# Patient Record
Sex: MALE | Race: WHITE | Employment: FULL TIME | URBAN - METROPOLITAN AREA
[De-identification: names, ages, dates, MRNs, and addresses within clinical notes are randomized per-mention and may not be internally consistent; named-entity substitution may affect disease eponyms.]

---

## 2017-07-24 ENCOUNTER — OFFICE VISIT (OUTPATIENT)
Dept: INTERNAL MEDICINE CLINIC | Age: 27
End: 2017-07-24

## 2017-07-24 VITALS
TEMPERATURE: 97.9 F | HEIGHT: 71 IN | OXYGEN SATURATION: 99 % | RESPIRATION RATE: 18 BRPM | WEIGHT: 166 LBS | HEART RATE: 62 BPM | BODY MASS INDEX: 23.24 KG/M2 | SYSTOLIC BLOOD PRESSURE: 124 MMHG | DIASTOLIC BLOOD PRESSURE: 74 MMHG

## 2017-07-24 DIAGNOSIS — Z86.19 HX OF CHLAMYDIA INFECTION: ICD-10-CM

## 2017-07-24 DIAGNOSIS — L70.9 ACNE, UNSPECIFIED ACNE TYPE: ICD-10-CM

## 2017-07-24 DIAGNOSIS — L65.9 HAIR LOSS: Primary | ICD-10-CM

## 2017-07-24 DIAGNOSIS — Z79.899 LONG-TERM USE OF HIGH-RISK MEDICATION: ICD-10-CM

## 2017-07-24 RX ORDER — FINASTERIDE 1 MG/1
1 TABLET, FILM COATED ORAL DAILY
Qty: 90 TAB | Refills: 4 | Status: SHIPPED | OUTPATIENT
Start: 2017-07-24 | End: 2017-07-27 | Stop reason: SDUPTHER

## 2017-07-24 NOTE — PROGRESS NOTES
Nadja Cota presents today at the clinic for      Chief Complaint   Patient presents with    Acne    Medication Refill    Ear Fullness        Wt Readings from Last 3 Encounters:   07/24/17 166 lb (75.3 kg)   05/04/16 155 lb (70.3 kg)   04/21/15 166 lb (75.3 kg)     Temp Readings from Last 3 Encounters:   07/24/17 97.9 °F (36.6 °C) (Oral)   05/04/16 98.3 °F (36.8 °C) (Oral)   04/21/15 97.7 °F (36.5 °C) (Oral)     BP Readings from Last 3 Encounters:   07/24/17 124/74   05/04/16 112/70   04/21/15 102/70     Pulse Readings from Last 3 Encounters:   07/24/17 62   05/04/16 87   04/21/15 86       There are no preventive care reminders to display for this patient. Coordination of Care:    1. Have you been to the ER, urgent care clinic since your last visit? Hospitalized since your last visit? No    2. Have you seen or consulted any other health care providers outside of the [address] [place] [address] since your last visit? Include any pap smears or colon screening.  No

## 2017-07-24 NOTE — MR AVS SNAPSHOT
Visit Information Date & Time Provider Department Dept. Phone Encounter #  
 7/24/2017  1:00 PM Daja Loredo PA-C [facility] [phone] Follow-up Instructions Return if symptoms worsen or fail to improve. Upcoming Health Maintenance Date Due INFLUENZA AGE 9 TO ADULT 8/1/2017 DTaP/Tdap/Td series (2 - Td) 7/24/2027 Allergies as of 7/24/2017  Review Complete On: 7/24/2017 By: Ivan Hope LPN Severity Noted Reaction Type Reactions Morphine  06/12/2012    Hives Current Immunizations  Never Reviewed No immunizations on file. Not reviewed this visit You Were Diagnosed With   
  
 Codes Comments Hair loss    -  Primary ICD-10-CM: L65.9 ICD-9-CM: 704.00 Long-term use of high-risk medication     ICD-10-CM: Z79.899 ICD-9-CM: V58.69 Hx of chlamydia infection     ICD-10-CM: Z86.19 ICD-9-CM: V12.09 Acne, unspecified acne type     ICD-10-CM: L70.9 ICD-9-CM: 706.1 Vitals BP Pulse Temp Resp Height(growth percentile) Weight(growth percentile) 124/74 (BP 1 Location: Left arm, BP Patient Position: Sitting) 62 97.9 °F (36.6 °C) (Oral) 18 5' 11\" (1.803 m) 166 lb (75.3 kg) SpO2 BMI Smoking Status 99% 23.15 kg/m2 Never Smoker BMI and BSA Data Body Mass Index Body Surface Area  
 23.15 kg/m 2 1.94 m 2 Preferred Pharmacy Pharmacy Name Phone [facility] [address] [facility], [address] Your Updated Medication List  
  
   
This list is accurate as of: 7/24/17 11:59 PM.  Always use your most recent med list.  
  
  
  
  
 finasteride 1 mg tablet Commonly known as:  Stas Medal Take 1 Tab by mouth daily. Prescriptions Sent to Pharmacy Refills  
 finasteride (PROPECIA) 1 mg tablet (Discontinued) 4 Sig: Take 1 Tab by mouth daily.   
 Class: Normal  
 Pharmacy: [facility] [address], [address] #: [phone] Route: Oral  
 Reason for Discontinue: Reorder We Performed the Following CHLAMYDIA/GC AMPLIFIED URINE [id number] CPT(R)] CHLAMYDIA/GC PCR [13237 CPT(R)] METABOLIC PANEL, COMPREHENSIVE [31439 CPT(R)] RI COLLECTION VENOUS BLOOD,VENIPUNCTURE [id number] CPT(R)] Follow-up Instructions Return if symptoms worsen or fail to improve. To-Do List   
 07/24/2017 Lab:  CHLAMYDIA/GC PCR   
  
 07/24/2017 Lab:  METABOLIC PANEL, COMPREHENSIVE Patient Instructions Hair Loss From Alopecia Areata: Care Instructions Your Care Instructions Alopecia areata is a type of hair loss that affects the hair on the scalp or other areas of the body. It's a problem that can go away for some time and then come back. This condition is most common in people who are younger than 21, but it can happen to children and adults of any age. Hair loss can affect how you feel about yourself. Your hair may fall out in clumps and grow back over time. In rare cases, a person with alopecia may lose all body hair. The pattern of hair loss and growth is different for everyone. You can treat alopecia with medicine, but treatment does not always work. You may have shots of medicine in your scalp or skin, take pills, or put the medicine on your scalp or skin. Or you may decide to wait and see whether your hair grows again before trying medicine. Because hair loss is upsetting for most people, seek support from family and friends. Talk to a counselor or other professional if you need more help. Follow-up care is a key part of your treatment and safety. Be sure to make and go to all appointments, and call your doctor if you are having problems. It's also a good idea to know your test results and keep a list of the medicines you take. How can you care for yourself at home? · If you decide to treat your hair loss, use medicines exactly as prescribed. Call your doctor if you think you are having a problem with your medicine. · If you want to cover your scalp, you can use hats, scarves, or other head coverings. Or you may want to wear a hairpiece or a wig. · Try hair care products and styling techniques. Hair care products or perms may make hair appear thicker. You can use dyes to color the scalp. But long-term use of perms or dyes may lead to more hair loss. · Talk to your doctor if you are very upset about your hair loss. You can get counseling to help you cope with the condition. When should you call for help? Watch closely for changes in your health, and be sure to contact your doctor if: 
· You do not get better as expected. Where can you learn more? Go to http://jaqueline-jante.info/. Enter S895 in the search box to learn more about \"Hair Loss From Alopecia Areata: Care Instructions. \" Current as of: October 13, 2016 Content Version: 11.3 © 6927-8076 Blyk. Care instructions adapted under license by Snowshoefood (which disclaims liability or warranty for this information). If you have questions about a medical condition or this instruction, always ask your healthcare professional. [address] any warranty or liability for your use of this information. Introducing [facility]! [facility] introduces E-Buy patient portal. Now you can access parts of your medical record, email your doctor's office, and request medication refills online. 1. In your internet browser, go to [email or web address] 2. Click on the First Time User? Click Here link in the Sign In box. You will see the New Member Sign Up page. 3. Enter your E-Buy Access Code exactly as it appears below. You will not need to use this code after youve completed the sign-up process.  If you do not sign up before the expiration date, you must request a new code. · CritiTech Access Code: [email or web address] Expires: 10/26/2017 11:15 AM 
 
4. Enter the last four digits of your Social Security Number (xxxx) and Date of Birth (mm/dd/yyyy) as indicated and click Submit. You will be taken to the next sign-up page. 5. Create a CritiTech ID. This will be your CritiTech login ID and cannot be changed, so think of one that is secure and easy to remember. 6. Create a CritiTech password. You can change your password at any time. 7. Enter your Password Reset Question and Answer. This can be used at a later time if you forget your password. 8. Enter your e-mail address. You will receive e-mail notification when new information is available in [address]. 9. Click Sign Up. You can now view and download portions of your medical record. 10. Click the Download Summary menu link to download a portable copy of your medical information. If you have questions, please visit the Frequently Asked Questions section of the CritiTech website. Remember, CritiTech is NOT to be used for urgent needs. For medical emergencies, dial 911. Now available from your iPhone and Android! Please provide this summary of care documentation to your next provider. Your primary care clinician is listed as Mishel Garcia. If you have any questions after today's visit, please call [phone].

## 2017-07-25 ENCOUNTER — TELEPHONE (OUTPATIENT)
Dept: INTERNAL MEDICINE CLINIC | Age: 27
End: 2017-07-25

## 2017-07-25 LAB
A-G RATIO,AGRAT: 1.5 RATIO (ref 1.1–2.6)
ALBUMIN SERPL-MCNC: 4.6 G/DL (ref 3.5–5)
ALP SERPL-CCNC: 59 U/L (ref 25–115)
ALT SERPL-CCNC: 12 U/L (ref 5–40)
ANION GAP SERPL CALC-SCNC: 15 MMOL/L
AST SERPL W P-5'-P-CCNC: 23 U/L (ref 10–37)
BILIRUB SERPL-MCNC: 0.9 MG/DL (ref 0.2–1.2)
BUN SERPL-MCNC: 14 MG/DL (ref 6–22)
CALCIUM SERPL-MCNC: 8.9 MG/DL (ref 8.4–10.4)
CHLAMYDIA AMPLIFIED URINE: NEGATIVE
CHLORIDE SERPL-SCNC: 101 MMOL/L (ref 98–110)
CO2 SERPL-SCNC: 24 MMOL/L (ref 20–32)
CREAT SERPL-MCNC: 0.9 MG/DL (ref 0.5–1.2)
GC AMPLIFIED URINE,919: NEGATIVE
GFRAA, 66117: >60
GFRNA, 66118: >60
GLOBULIN,GLOB: 3 G/DL (ref 2–4)
GLUCOSE SERPL-MCNC: 66 MG/DL (ref 65–99)
POTASSIUM SERPL-SCNC: 4.4 MMOL/L (ref 3.5–5.5)
PROT SERPL-MCNC: 7.6 G/DL (ref 6.4–8.3)
SODIUM SERPL-SCNC: 140 MMOL/L (ref 133–145)

## 2017-07-27 DIAGNOSIS — L65.9 HAIR LOSS: ICD-10-CM

## 2017-07-27 RX ORDER — FINASTERIDE 1 MG/1
TABLET, FILM COATED ORAL
Qty: 90 TAB | Refills: 3 | Status: SHIPPED | OUTPATIENT
Start: 2017-07-27 | End: 2018-09-24 | Stop reason: SDUPTHER

## 2017-07-28 NOTE — PROGRESS NOTES
HISTORY OF PRESENT ILLNESS  Britt Raymond is a 32 y.o. male. HPI   Pt is here for a refill on his propecia. He has recently started breaking out on his chest and back and was wondering what he should do. He has been working out more and sweating a lot. He also would like STD testing because he is starting a new relationship and wants to be safe. Denies any discharge, dysuria, and urinary frequency. He has had chlamydia about 4-5 yrs ago and was treated but never followed up for repeat testing. Allergies   Allergen Reactions    Morphine Hives       Current Outpatient Prescriptions   Medication Sig    finasteride (PROPECIA) 1 mg tablet TAKE ONE TABLET BY MOUTH ONCE DAILY     No current facility-administered medications for this visit. Review of Systems   Constitutional: Negative. Negative for chills, fever and malaise/fatigue. HENT: Negative. Negative for congestion, ear pain, sore throat and tinnitus. Eyes: Negative. Negative for blurred vision, double vision and photophobia. Respiratory: Negative. Negative for cough, shortness of breath and wheezing. Cardiovascular: Negative. Negative for chest pain, palpitations and leg swelling. Gastrointestinal: Negative. Negative for abdominal pain, heartburn, nausea and vomiting. Genitourinary: Negative. Negative for dysuria, frequency, hematuria and urgency. Musculoskeletal: Negative. Negative for back pain, joint pain, myalgias and neck pain. Skin: Negative for itching and rash. acne   Neurological: Negative. Negative for dizziness, tingling, tremors and headaches. Psychiatric/Behavioral: Negative. Negative for depression and memory loss. The patient is not nervous/anxious and does not have insomnia.       Visit Vitals    /74 (BP 1 Location: Left arm, BP Patient Position: Sitting)    Pulse 62    Temp 97.9 °F (36.6 °C) (Oral)    Resp 18    Ht 5' 11\" (1.803 m)    Wt 166 lb (75.3 kg)    SpO2 99%    BMI 23.15 kg/m2       Physical Exam   Constitutional: He is oriented to person, place, and time. He appears well-developed and well-nourished. HENT:   Head: Normocephalic and atraumatic. Nose: Nose normal.   Mouth/Throat: Oropharynx is clear and moist.   Cardiovascular: Normal rate, regular rhythm and normal heart sounds. Exam reveals no gallop and no friction rub. No murmur heard. Pulmonary/Chest: Effort normal and breath sounds normal. No respiratory distress. He has no wheezes. He has no rales. Neurological: He is alert and oriented to person, place, and time. Skin: Skin is warm and dry. Rash noted. Rash is pustular (upper back and chest). Psychiatric: He has a normal mood and affect. His behavior is normal.       ASSESSMENT and PLAN    ICD-10-CM ICD-9-CM    1. Hair loss L65.9 704.00 DISCONTINUED: finasteride (PROPECIA) 1 mg tablet   2. Long-term use of high-risk medication A13.154 G19.93 METABOLIC PANEL, COMPREHENSIVE      METABOLIC PANEL, COMPREHENSIVE      SD COLLECTION VENOUS BLOOD,VENIPUNCTURE   3. Hx of chlamydia infection Z86.19 V12.09 CHLAMYDIA/GC PCR      CHLAMYDIA/GC PCR   4. Acne, unspecified acne type L70.9 706.1      Follow-up Disposition:  Return if symptoms worsen or fail to improve. Pt expressed understanding of visit summary and plans for any follow ups or referrals as well as any medications prescribed.

## 2017-07-28 NOTE — PATIENT INSTRUCTIONS
Hair Loss From Alopecia Areata: Care Instructions  Your Care Instructions    Alopecia areata is a type of hair loss that affects the hair on the scalp or other areas of the body. It's a problem that can go away for some time and then come back. This condition is most common in people who are younger than 21, but it can happen to children and adults of any age. Hair loss can affect how you feel about yourself. Your hair may fall out in clumps and grow back over time. In rare cases, a person with alopecia may lose all body hair. The pattern of hair loss and growth is different for everyone. You can treat alopecia with medicine, but treatment does not always work. You may have shots of medicine in your scalp or skin, take pills, or put the medicine on your scalp or skin. Or you may decide to wait and see whether your hair grows again before trying medicine. Because hair loss is upsetting for most people, seek support from family and friends. Talk to a counselor or other professional if you need more help. Follow-up care is a key part of your treatment and safety. Be sure to make and go to all appointments, and call your doctor if you are having problems. It's also a good idea to know your test results and keep a list of the medicines you take. How can you care for yourself at home? · If you decide to treat your hair loss, use medicines exactly as prescribed. Call your doctor if you think you are having a problem with your medicine. · If you want to cover your scalp, you can use hats, scarves, or other head coverings. Or you may want to wear a hairpiece or a wig. · Try hair care products and styling techniques. Hair care products or perms may make hair appear thicker. You can use dyes to color the scalp. But long-term use of perms or dyes may lead to more hair loss. · Talk to your doctor if you are very upset about your hair loss. You can get counseling to help you cope with the condition.   When should you call for help? Watch closely for changes in your health, and be sure to contact your doctor if:  · You do not get better as expected. Where can you learn more? Go to http://jaqueline-janet.info/. Enter R919 in the search box to learn more about \"Hair Loss From Alopecia Areata: Care Instructions. \"  Current as of: October 13, 2016  Content Version: 11.3  © 2070-1726 Solar Power Limited. Care instructions adapted under license by Try [employer] World (which disclaims liability or warranty for this information). If you have questions about a medical condition or this instruction, always ask your healthcare professional. [address] any warranty or liability for your use of this information.

## 2017-10-19 ENCOUNTER — OFFICE VISIT (OUTPATIENT)
Dept: INTERNAL MEDICINE CLINIC | Age: 27
End: 2017-10-19

## 2017-10-19 VITALS
TEMPERATURE: 97.9 F | SYSTOLIC BLOOD PRESSURE: 116 MMHG | HEIGHT: 71 IN | OXYGEN SATURATION: 98 % | DIASTOLIC BLOOD PRESSURE: 76 MMHG | RESPIRATION RATE: 18 BRPM | HEART RATE: 90 BPM | WEIGHT: 164 LBS | BODY MASS INDEX: 22.96 KG/M2

## 2017-10-19 DIAGNOSIS — G44.219 EPISODIC TENSION-TYPE HEADACHE, NOT INTRACTABLE: Primary | ICD-10-CM

## 2017-10-19 RX ORDER — CYCLOBENZAPRINE HCL 10 MG
10 TABLET ORAL
Qty: 30 TAB | Refills: 0 | Status: SHIPPED | OUTPATIENT
Start: 2017-10-19 | End: 2018-09-24

## 2017-10-19 RX ORDER — IBUPROFEN 800 MG/1
800 TABLET ORAL
Qty: 60 TAB | Refills: 1 | Status: SHIPPED | OUTPATIENT
Start: 2017-10-19 | End: 2018-09-24

## 2017-10-19 NOTE — PROGRESS NOTES
Alexandra Colorado presents today at the clinic for      Chief Complaint   Patient presents with    Headache     x2 weeks        Wt Readings from Last 3 Encounters:   10/19/17 164 lb (74.4 kg)   07/24/17 166 lb (75.3 kg)   05/04/16 155 lb (70.3 kg)     Temp Readings from Last 3 Encounters:   10/19/17 97.9 °F (36.6 °C) (Oral)   07/24/17 97.9 °F (36.6 °C) (Oral)   05/04/16 98.3 °F (36.8 °C) (Oral)     BP Readings from Last 3 Encounters:   10/19/17 116/76   07/24/17 124/74   05/04/16 112/70     Pulse Readings from Last 3 Encounters:   10/19/17 90   07/24/17 62   05/04/16 87       There are no preventive care reminders to display for this patient. Coordination of Care:    1. Have you been to the ER, urgent care clinic since your last visit? Hospitalized since your last visit? No    2. Have you seen or consulted any other health care providers outside of the [address] [place] [address] since your last visit? Include any pap smears or colon screening.  No

## 2017-10-19 NOTE — MR AVS SNAPSHOT
Visit Information Date & Time Provider Department Dept. Phone Encounter #  
 10/19/2017  2:45 PM Ezequiel Walker PA-C [facility] [phone] [id number] Follow-up Instructions Return if symptoms worsen or fail to improve. Upcoming Health Maintenance Date Due DTaP/Tdap/Td series (2 - Td) 7/24/2027 Allergies as of 10/19/2017  Review Complete On: 10/19/2017 By: Teresa Chi LPN Severity Noted Reaction Type Reactions Morphine  06/12/2012    Hives Current Immunizations  Never Reviewed No immunizations on file. Not reviewed this visit You Were Diagnosed With   
  
 Codes Comments Episodic tension-type headache, not intractable    -  Primary ICD-10-CM: T40.651 ICD-9-CM: 339.11 Vitals BP Pulse Temp Resp Height(growth percentile) Weight(growth percentile) 116/76 (BP 1 Location: Left arm, BP Patient Position: Sitting) 90 97.9 °F (36.6 °C) (Oral) 18 5' 11\" (1.803 m) 164 lb (74.4 kg) SpO2 BMI Smoking Status 98% 22.87 kg/m2 Never Smoker BMI and BSA Data Body Mass Index Body Surface Area  
 22.87 kg/m 2 1.93 m 2 Preferred Pharmacy Pharmacy Name Phone [facility] [address] [facility], [address] [place] [address] Your Updated Medication List  
  
   
This list is accurate as of: 10/19/17 11:59 PM.  Always use your most recent med list.  
  
  
  
  
 cyclobenzaprine 10 mg tablet Commonly known as:  FLEXERIL Take 1 Tab by mouth three (3) times daily as needed for Muscle Spasm(s). finasteride 1 mg tablet Commonly known as:  Mazin Sacks TAKE ONE TABLET BY MOUTH ONCE DAILY  
  
 ibuprofen 800 mg tablet Commonly known as:  MOTRIN Take 1 Tab by mouth every eight (8) hours as needed for Pain. Prescriptions Sent to Pharmacy  Refills  
 cyclobenzaprine (FLEXERIL) 10 mg tablet 0  
 Sig: Take 1 Tab by mouth three (3) times daily as needed for Muscle Spasm(s). Class: Normal  
 Pharmacy: [address], [address] [place] [address] Ph #: [phone] Route: Oral  
 ibuprofen (MOTRIN) 800 mg tablet 1 Sig: Take 1 Tab by mouth every eight (8) hours as needed for Pain. Class: Normal  
 Pharmacy: [address], [address] [place] [address] Ph #: [phone] Route: Oral  
  
Follow-up Instructions Return if symptoms worsen or fail to improve. Patient Instructions Headache: Care Instructions Your Care Instructions Headaches have many possible causes. Most headaches aren't a sign of a more serious problem, and they will get better on their own. Home treatment may help you feel better faster. The doctor has checked you carefully, but problems can develop later. If you notice any problems or new symptoms, get medical treatment right away. Follow-up care is a key part of your treatment and safety. Be sure to make and go to all appointments, and call your doctor if you are having problems. It's also a good idea to know your test results and keep a list of the medicines you take. How can you care for yourself at home? · Do not drive if you have taken a prescription pain medicine. · Rest in a quiet, dark room until your headache is gone. Close your eyes and try to relax or go to sleep. Don't watch TV or read. · Put a cold, moist cloth or cold pack on the painful area for 10 to 20 minutes at a time. Put a thin cloth between the cold pack and your skin. · Use a warm, moist towel or a heating pad set on low to relax tight shoulder and neck muscles. · Have someone gently massage your neck and shoulders. · Take pain medicines exactly as directed. ¨ If the doctor gave you a prescription medicine for pain, take it as prescribed.  
¨ If you are not taking a prescription pain medicine, ask your doctor if you can take an over-the-counter medicine. · Be careful not to take pain medicine more often than the instructions allow, because you may get worse or more frequent headaches when the medicine wears off. · Do not ignore new symptoms that occur with a headache, such as a fever, weakness or numbness, vision changes, or confusion. These may be signs of a more serious problem. To prevent headaches · Keep a headache diary so you can figure out what triggers your headaches. Avoiding triggers may help you prevent headaches. Record when each headache began, how long it lasted, and what the pain was like (throbbing, aching, stabbing, or dull). Write down any other symptoms you had with the headache, such as nausea, flashing lights or dark spots, or sensitivity to bright light or loud noise. Note if the headache occurred near your period. List anything that might have triggered the headache, such as certain foods (chocolate, cheese, wine) or odors, smoke, bright light, stress, or lack of sleep. · Find healthy ways to deal with stress. Headaches are most common during or right after stressful times. Take time to relax before and after you do something that has caused a headache in the past. 
· Try to keep your muscles relaxed by keeping good posture. Check your jaw, face, neck, and shoulder muscles for tension, and try relaxing them. When sitting at a desk, change positions often, and stretch for 30 seconds each hour. · Get plenty of sleep and exercise. · Eat regularly and well. Long periods without food can trigger a headache. · Treat yourself to a massage. Some people find that regular massages are very helpful in relieving tension. · Limit caffeine by not drinking too much coffee, tea, or soda. But don't quit caffeine suddenly, because that can also give you headaches.  
· Reduce eyestrain from computers by blinking frequently and looking away from the computer screen every so often. Make sure you have proper eyewear and that your monitor is set up properly, about an arm's length away. · Seek help if you have depression or anxiety. Your headaches may be linked to these conditions. Treatment can both prevent headaches and help with symptoms of anxiety or depression. When should you call for help? Call 911 anytime you think you may need emergency care. For example, call if: 
· You have signs of a stroke. These may include: 
¨ Sudden numbness, paralysis, or weakness in your face, arm, or leg, especially on only one side of your body. ¨ Sudden vision changes. ¨ Sudden trouble speaking. ¨ Sudden confusion or trouble understanding simple statements. ¨ Sudden problems with walking or balance. ¨ A sudden, severe headache that is different from past headaches. Call your doctor now or seek immediate medical care if: 
· You have a new or worse headache. · Your headache gets much worse. Where can you learn more? Go to http://jaqueline-janet.info/. Enter M271 in the search box to learn more about \"Headache: Care Instructions. \" Current as of: October 14, 2016 Content Version: 11.3 © 4333-6721 Able Planet. Care instructions adapted under license by Koko (which disclaims liability or warranty for this information). If you have questions about a medical condition or this instruction, always ask your healthcare professional. [address] any warranty or liability for your use of this information. Introducing [facility]! Romayne Duster introduces woohoo mobile marketing patient portal. Now you can access parts of your medical record, email your doctor's office, and request medication refills online. 1. In your internet browser, go to [email or web address] 2. Click on the First Time User? Click Here link in the Sign In box. You will see the New Member Sign Up page. 3. Enter your AdzCentral Access Code exactly as it appears below. You will not need to use this code after youve completed the sign-up process. If you do not sign up before the expiration date, you must request a new code. · AdzCentral Access Code: [email or web address] Expires: 10/26/2017 11:15 AM 
 
4. Enter the last four digits of your Social Security Number (xxxx) and Date of Birth (mm/dd/yyyy) as indicated and click Submit. You will be taken to the next sign-up page. 5. Create a AdzCentral ID. This will be your AdzCentral login ID and cannot be changed, so think of one that is secure and easy to remember. 6. Create a AdzCentral password. You can change your password at any time. 7. Enter your Password Reset Question and Answer. This can be used at a later time if you forget your password. 8. Enter your e-mail address. You will receive e-mail notification when new information is available in [address] 9. Click Sign Up. You can now view and download portions of your medical record. 10. Click the Download Summary menu link to download a portable copy of your medical information. If you have questions, please visit the Frequently Asked Questions section of the AdzCentral website. Remember, AdzCentral is NOT to be used for urgent needs. For medical emergencies, dial 911. Now available from your iPhone and Android! Please provide this summary of care documentation to your next provider. Your primary care clinician is listed as Pop Garcia. If you have any questions after today's visit, please call [phone].

## 2017-10-23 NOTE — PATIENT INSTRUCTIONS

## 2017-10-23 NOTE — PROGRESS NOTES
HISTORY OF PRESENT ILLNESS  Taniya George is a 32 y.o. male. HPI   Pt c/o HA x 10d that started when he was lifting weights. Pt states he felt a pull in his neck and since has had a HA that starts from his neck and radiates up to his head. Pt has had the GROVES everyday and is better when he wakes up but worse when as the day goes on. Pt has a hx of migraines and states they don't feel like this. Heat helps temporarily. Denies N/V, vision changes, fever, chills, congestion, and joint pain. Allergies   Allergen Reactions    Morphine Hives       Current Outpatient Prescriptions   Medication Sig    cyclobenzaprine (FLEXERIL) 10 mg tablet Take 1 Tab by mouth three (3) times daily as needed for Muscle Spasm(s).  ibuprofen (MOTRIN) 800 mg tablet Take 1 Tab by mouth every eight (8) hours as needed for Pain.  finasteride (PROPECIA) 1 mg tablet TAKE ONE TABLET BY MOUTH ONCE DAILY     No current facility-administered medications for this visit. Review of Systems   Constitutional: Negative. Negative for chills, fever and malaise/fatigue. HENT: Negative. Negative for congestion, ear pain, sore throat and tinnitus. Eyes: Negative. Negative for blurred vision, double vision and photophobia. Respiratory: Negative. Negative for cough, shortness of breath and wheezing. Cardiovascular: Negative. Negative for chest pain, palpitations and leg swelling. Gastrointestinal: Negative. Negative for abdominal pain, heartburn, nausea and vomiting. Genitourinary: Negative. Negative for dysuria, frequency, hematuria and urgency. Musculoskeletal: Negative. Negative for back pain, joint pain, myalgias and neck pain. Skin: Negative. Negative for itching and rash. Neurological: Positive for headaches. Negative for dizziness, tingling and tremors. Psychiatric/Behavioral: Negative. Negative for depression and memory loss. The patient is not nervous/anxious and does not have insomnia.       Visit Vitals  /76 (BP 1 Location: Left arm, BP Patient Position: Sitting)    Pulse 90    Temp 97.9 °F (36.6 °C) (Oral)    Resp 18    Ht 5' 11\" (1.803 m)    Wt 164 lb (74.4 kg)    SpO2 98%    BMI 22.87 kg/m2       Physical Exam   Constitutional: He is oriented to person, place, and time. He appears well-developed and well-nourished. HENT:   Head: Normocephalic and atraumatic. Nose: Nose normal.   Mouth/Throat: Oropharynx is clear and moist.   Eyes: Conjunctivae and EOM are normal. Right eye exhibits no nystagmus. Left eye exhibits no nystagmus. Cardiovascular: Normal rate, regular rhythm and normal heart sounds. Exam reveals no gallop and no friction rub. No murmur heard. Pulmonary/Chest: Effort normal and breath sounds normal. No respiratory distress. He has no wheezes. He has no rales. Musculoskeletal:        Cervical back: He exhibits tenderness and spasm. He exhibits normal range of motion, no bony tenderness, no swelling and no deformity. Neurological: He is alert and oriented to person, place, and time. Skin: Skin is warm and dry. Psychiatric: He has a normal mood and affect. His behavior is normal.       ASSESSMENT and PLAN    ICD-10-CM ICD-9-CM    1. Episodic tension-type headache, not intractable G44.219 339.11 cyclobenzaprine (FLEXERIL) 10 mg tablet      ibuprofen (MOTRIN) 800 mg tablet     Follow-up Disposition:  Return if symptoms worsen or fail to improve. Pt expressed understanding of visit summary and plans for any follow ups or referrals as well as any medications prescribed.

## 2017-10-25 ENCOUNTER — OFFICE VISIT (OUTPATIENT)
Dept: INTERNAL MEDICINE CLINIC | Age: 27
End: 2017-10-25

## 2017-10-25 ENCOUNTER — TELEPHONE (OUTPATIENT)
Dept: INTERNAL MEDICINE CLINIC | Age: 27
End: 2017-10-25

## 2017-10-25 DIAGNOSIS — R51.9 NONINTRACTABLE HEADACHE, UNSPECIFIED CHRONICITY PATTERN, UNSPECIFIED HEADACHE TYPE: Primary | ICD-10-CM

## 2017-10-26 DIAGNOSIS — R51.9 NONINTRACTABLE HEADACHE, UNSPECIFIED CHRONICITY PATTERN, UNSPECIFIED HEADACHE TYPE: ICD-10-CM

## 2017-11-02 ENCOUNTER — HOSPITAL ENCOUNTER (OUTPATIENT)
Dept: MRI IMAGING | Age: 27
Discharge: HOME | End: 2017-11-02
Attending: PHYSICIAN ASSISTANT
Payer: COMMERCIAL

## 2017-11-02 VITALS — WEIGHT: 170 LBS | BODY MASS INDEX: 23.71 KG/M2

## 2017-11-02 DIAGNOSIS — R51.9 NONINTRACTABLE HEADACHE, UNSPECIFIED CHRONICITY PATTERN, UNSPECIFIED HEADACHE TYPE: ICD-10-CM

## 2017-11-02 PROCEDURE — A9577 INJ MULTIHANCE: HCPCS | Performed by: PHYSICIAN ASSISTANT

## 2017-11-02 PROCEDURE — 74011250636 HC RX REV CODE- 250/636: Performed by: PHYSICIAN ASSISTANT

## 2017-11-02 PROCEDURE — 70553 MRI BRAIN STEM W/O & W/DYE: CPT

## 2017-11-02 RX ADMIN — GADOBENATE DIMEGLUMINE 15 ML: 529 INJECTION, SOLUTION INTRAVENOUS at 08:42

## 2018-09-24 ENCOUNTER — OFFICE VISIT (OUTPATIENT)
Dept: INTERNAL MEDICINE CLINIC | Age: 28
End: 2018-09-24

## 2018-09-24 VITALS
DIASTOLIC BLOOD PRESSURE: 82 MMHG | OXYGEN SATURATION: 98 % | HEART RATE: 79 BPM | TEMPERATURE: 98.3 F | BODY MASS INDEX: 24.64 KG/M2 | SYSTOLIC BLOOD PRESSURE: 125 MMHG | HEIGHT: 71 IN | WEIGHT: 176 LBS | RESPIRATION RATE: 18 BRPM

## 2018-09-24 DIAGNOSIS — L65.9 HAIR LOSS: ICD-10-CM

## 2018-09-24 DIAGNOSIS — L65.9 HAIR LOSS: Primary | ICD-10-CM

## 2018-09-24 RX ORDER — FINASTERIDE 1 MG/1
TABLET, FILM COATED ORAL
Qty: 90 TAB | Refills: 3 | Status: CANCELLED | OUTPATIENT
Start: 2018-09-24

## 2018-09-24 RX ORDER — FINASTERIDE 1 MG/1
TABLET, FILM COATED ORAL
Qty: 90 TAB | Refills: 3 | Status: SHIPPED | OUTPATIENT
Start: 2018-09-24 | End: 2019-11-05 | Stop reason: SDUPTHER

## 2018-09-24 NOTE — PATIENT INSTRUCTIONS
Hair Loss From Alopecia Areata: Care Instructions Your Care Instructions Alopecia areata is a type of hair loss that affects the hair on the scalp or other areas of the body. It's a problem that can go away for some time and then come back. This condition is most common in people who are younger than 21, but it can happen to children and adults of any age. Hair loss can affect how you feel about yourself. Your hair may fall out in clumps and grow back over time. In rare cases, a person with alopecia may lose all body hair. The pattern of hair loss and growth is different for everyone. You can treat alopecia with medicine, but treatment does not always work. You may have shots of medicine in your scalp or skin, take pills, or put the medicine on your scalp or skin. Or you may decide to wait and see whether your hair grows again before trying medicine. Because hair loss is upsetting for most people, seek support from family and friends. Talk to a counselor or other professional if you need more help. Follow-up care is a key part of your treatment and safety. Be sure to make and go to all appointments, and call your doctor if you are having problems. It's also a good idea to know your test results and keep a list of the medicines you take. How can you care for yourself at home? · If you decide to treat your hair loss, use medicines exactly as prescribed. Call your doctor if you think you are having a problem with your medicine. · If you want to cover your scalp, you can use hats, scarves, or other head coverings. Or you may want to wear a hairpiece or a wig. · Try hair care products and styling techniques. Hair care products or perms may make hair appear thicker. You can use dyes to color the scalp. But long-term use of perms or dyes may lead to more hair loss. · Talk to your doctor if you are very upset about your hair loss. You can get counseling to help you cope with the condition. When should you call for help? Watch closely for changes in your health, and be sure to contact your doctor if: 
  · You do not get better as expected. Where can you learn more? Go to http://jaqueline-janet.info/. Enter F145 in the search box to learn more about \"Hair Loss From Alopecia Areata: Care Instructions. \" Current as of: October 5, 2017 Content Version: 11.7 © 1844-0433 [employer]. Care instructions adapted under license by [employer] (which disclaims liability or warranty for this information). If you have questions about a medical condition or this instruction, always ask your healthcare professional. [address] any warranty or liability for your use of this information.

## 2018-09-24 NOTE — MR AVS SNAPSHOT
Watson Sales 
 
 
 [facility] [address] [facility] [address] 
[phone] Patient: Monta Boast MRN: [id number] [id number] Visit Information Date & Time Provider Department Dept. Phone Encounter #  
 9/24/2018  2:00 PM Rajani Gonzalez PA-C [facility] [phone] [id number] Follow-up Instructions Return in about 1 year (around 9/24/2019) for hair loss. Upcoming Health Maintenance Date Due Influenza Age 5 to Adult 8/1/2018 DTaP/Tdap/Td series (2 - Td) 7/24/2027 Allergies as of 9/24/2018  Review Complete On: 9/24/2018 By: Farhad Blackburn LPN Severity Noted Reaction Type Reactions Morphine  06/12/2012    Hives Current Immunizations  Never Reviewed No immunizations on file. Not reviewed this visit You Were Diagnosed With   
  
 Codes Comments Hair loss    -  Primary ICD-10-CM: L65.9 ICD-9-CM: 704.00 Vitals BP Pulse Temp Resp Height(growth percentile) Weight(growth percentile) 125/82 79 98.3 °F (36.8 °C) (Oral) 18 5' 11\" (1.803 m) 176 lb (79.8 kg) SpO2 BMI Smoking Status 98% 24.55 kg/m2 Never Smoker BMI and BSA Data Body Mass Index Body Surface Area 24.55 kg/m 2 2 m 2 Preferred Pharmacy Pharmacy Name Phone [facility] [address] [facility] 97. Your Updated Medication List  
  
   
This list is accurate as of 9/24/18  3:01 PM.  Always use your most recent med list.  
  
  
  
  
 cyclobenzaprine 10 mg tablet Commonly known as:  FLEXERIL Take 1 Tab by mouth three (3) times daily as needed for Muscle Spasm(s). finasteride 1 mg tablet Commonly known as:  Andrew Fend TAKE ONE TABLET BY MOUTH ONCE DAILY  
  
 ibuprofen 800 mg tablet Commonly known as:  MOTRIN Take 1 Tab by mouth every eight (8) hours as needed for Pain. Prescriptions Sent to Pharmacy Refills  
 finasteride (PROPECIA) 1 mg tablet 3 Sig: TAKE ONE TABLET BY MOUTH ONCE DAILY Class: Normal  
 Pharmacy: [facility] [address] [place] [address], [address] [place] [address] #: [phone] Follow-up Instructions Return in about 1 year (around 9/24/2019) for hair loss. To-Do List   
 09/24/2018 Lab:  METABOLIC PANEL, COMPREHENSIVE Patient Instructions Hair Loss From Alopecia Areata: Care Instructions Your Care Instructions Alopecia areata is a type of hair loss that affects the hair on the scalp or other areas of the body. It's a problem that can go away for some time and then come back. This condition is most common in people who are younger than 21, but it can happen to children and adults of any age. Hair loss can affect how you feel about yourself. Your hair may fall out in clumps and grow back over time. In rare cases, a person with alopecia may lose all body hair. The pattern of hair loss and growth is different for everyone. You can treat alopecia with medicine, but treatment does not always work. You may have shots of medicine in your scalp or skin, take pills, or put the medicine on your scalp or skin. Or you may decide to wait and see whether your hair grows again before trying medicine. Because hair loss is upsetting for most people, seek support from family and friends. Talk to a counselor or other professional if you need more help. Follow-up care is a key part of your treatment and safety. Be sure to make and go to all appointments, and call your doctor if you are having problems. It's also a good idea to know your test results and keep a list of the medicines you take. How can you care for yourself at home? · If you decide to treat your hair loss, use medicines exactly as prescribed. Call your doctor if you think you are having a problem with your medicine.  
· If you want to cover your scalp, you can use hats, scarves, or other head coverings. Or you may want to wear a hairpiece or a wig. · Try hair care products and styling techniques. Hair care products or perms may make hair appear thicker. You can use dyes to color the scalp. But long-term use of perms or dyes may lead to more hair loss. · Talk to your doctor if you are very upset about your hair loss. You can get counseling to help you cope with the condition. When should you call for help? Watch closely for changes in your health, and be sure to contact your doctor if: 
  · You do not get better as expected. Where can you learn more? Go to http://jaqueline-janet.info/. Enter Y605 in the search box to learn more about \"Hair Loss From Alopecia Areata: Care Instructions. \" Current as of: October 5, 2017 Content Version: 11.7 © 5543-4647 [employer]. Care instructions adapted under license by [employer] (which disclaims liability or warranty for this information). If you have questions about a medical condition or this instruction, always ask your healthcare professional. [address] any warranty or liability for your use of this information. Introducing [facility]! [facility] introduces [employer] patient portal. Now you can access parts of your medical record, email your doctor's office, and request medication refills online. 1. In your internet browser, go to [email or web address] 2. Click on the First Time User? Click Here link in the Sign In box. You will see the New Member Sign Up page. 3. Enter your [employer] Access Code exactly as it appears below. You will not need to use this code after youve completed the sign-up process. If you do not sign up before the expiration date, you must request a new code. · [employer] Access Code: [email or web address] Expires: 12/23/2018  3:01 PM 
 
4.  Enter the last four digits of your Social Security Number (xxxx) and Date of Birth (mm/dd/yyyy) as indicated and click Submit. You will be taken to the next sign-up page. 5. Create a PrivacyStar ID. This will be your PrivacyStar login ID and cannot be changed, so think of one that is secure and easy to remember. 6. Create a PrivacyStar password. You can change your password at any time. 7. Enter your Password Reset Question and Answer. This can be used at a later time if you forget your password. 8. Enter your e-mail address. You will receive e-mail notification when new information is available in [address] 9. Click Sign Up. You can now view and download portions of your medical record. 10. Click the Download Summary menu link to download a portable copy of your medical information. If you have questions, please visit the Frequently Asked Questions section of the PrivacyStar website. Remember, PrivacyStar is NOT to be used for urgent needs. For medical emergencies, dial 911. Now available from your iPhone and Android! Please provide this summary of care documentation to your next provider. Your primary care clinician is listed as [place]. [employer]. If you have any questions after today's visit, please call [phone].

## 2018-09-24 NOTE — PROGRESS NOTES
HISTORY OF PRESENT ILLNESS Deniece Fothergill is a 25 y.o. male. HPI Pt is here for a refill on his propecia. He is doing well. Will get CMP for long term med use. Pt has no complaints. Allergies Allergen Reactions  Morphine Hives Current Outpatient Prescriptions Medication Sig  
 finasteride (PROPECIA) 1 mg tablet TAKE ONE TABLET BY MOUTH ONCE DAILY No current facility-administered medications for this visit. Review of Systems Constitutional: Negative. Negative for chills, fever and malaise/fatigue. HENT: Negative. Negative for congestion, ear pain, sore throat and tinnitus. Eyes: Negative. Negative for blurred vision, double vision and photophobia. Respiratory: Negative. Negative for cough, shortness of breath and wheezing. Cardiovascular: Negative. Negative for chest pain, palpitations and leg swelling. Gastrointestinal: Negative. Negative for abdominal pain, heartburn, nausea and vomiting. Genitourinary: Negative. Negative for dysuria, frequency, hematuria and urgency. Musculoskeletal: Negative. Negative for back pain, joint pain, myalgias and neck pain. Skin: Negative for itching and rash. Neurological: Negative. Negative for dizziness, tingling, tremors and headaches. Psychiatric/Behavioral: Negative. Negative for depression and memory loss. The patient is not nervous/anxious and does not have insomnia. Visit Vitals  /82  Pulse 79  Temp 98.3 °F (36.8 °C) (Oral)  Resp 18  Ht 5' 11\" (1.803 m)  Wt 176 lb (79.8 kg)  SpO2 98%  BMI 24.55 kg/m2 Physical Exam  
Constitutional: He is oriented to person, place, and time. He appears well-developed and well-nourished. HENT:  
Head: Normocephalic and atraumatic. Nose: Nose normal.  
Mouth/Throat: Oropharynx is clear and moist.  
Cardiovascular: Normal rate, regular rhythm and normal heart sounds. Exam reveals no gallop and no friction rub. No murmur heard. Pulmonary/Chest: Effort normal and breath sounds normal. No respiratory distress. He has no wheezes. He has no rales. Neurological: He is alert and oriented to person, place, and time. Skin: Skin is warm and dry. Psychiatric: He has a normal mood and affect. His behavior is normal.  
 
 
ASSESSMENT and PLAN 
  ICD-10-CM ICD-9-CM 1. Hair loss F52.7 919.51 METABOLIC PANEL, COMPREHENSIVE  
   finasteride (PROPECIA) 1 mg tablet Follow-up Disposition: 
Return in about 1 year (around 9/24/2019) for hair loss. Pt expressed understanding of visit summary and plans for any follow ups or referrals as well as any medications prescribed.

## 2018-09-24 NOTE — PROGRESS NOTES
Chief Complaint Patient presents with  
 Hair/Scalp Problem Loss 1. Have you been to the ER, urgent care clinic since your last visit? Hospitalized since your last visit? No 
 
2. Have you seen or consulted any other health care providers outside of the [address] [place] [address] since your last visit? Include any pap smears or colon screening. Yes When: Neurology in January for headaches

## 2018-09-25 LAB
ALBUMIN SERPL-MCNC: 4.7 G/DL (ref 3.5–5.5)
ALBUMIN/GLOB SERPL: 1.5 {RATIO} (ref 1.2–2.2)
ALP SERPL-CCNC: 62 IU/L (ref 39–117)
ALT SERPL-CCNC: 13 IU/L (ref 0–44)
AST SERPL-CCNC: 21 IU/L (ref 0–40)
BILIRUB SERPL-MCNC: 0.6 MG/DL (ref 0–1.2)
BUN SERPL-MCNC: 12 MG/DL (ref 6–20)
BUN/CREAT SERPL: 15 (ref 9–20)
CALCIUM SERPL-MCNC: 9.6 MG/DL (ref 8.7–10.2)
CHLORIDE SERPL-SCNC: 100 MMOL/L (ref 96–106)
CO2 SERPL-SCNC: 23 MMOL/L (ref 20–29)
CREAT SERPL-MCNC: 0.78 MG/DL (ref 0.76–1.27)
GLOBULIN SER CALC-MCNC: 3.2 G/DL (ref 1.5–4.5)
GLUCOSE SERPL-MCNC: 90 MG/DL (ref 65–99)
POTASSIUM SERPL-SCNC: 4.7 MMOL/L (ref 3.5–5.2)
PROT SERPL-MCNC: 7.9 G/DL (ref 6–8.5)
SODIUM SERPL-SCNC: 139 MMOL/L (ref 134–144)
SPECIMEN STATUS REPORT, ROLRST: NORMAL

## 2018-10-03 ENCOUNTER — CLINICAL SUPPORT (OUTPATIENT)
Dept: INTERNAL MEDICINE CLINIC | Age: 28
End: 2018-10-03

## 2018-10-03 DIAGNOSIS — Z23 ENCOUNTER FOR IMMUNIZATION: Primary | ICD-10-CM

## 2019-11-05 ENCOUNTER — HOSPITAL ENCOUNTER (OUTPATIENT)
Dept: LAB | Age: 29
Discharge: HOME | End: 2019-11-05
Payer: COMMERCIAL

## 2019-11-05 ENCOUNTER — OFFICE VISIT (OUTPATIENT)
Dept: INTERNAL MEDICINE CLINIC | Age: 29
End: 2019-11-05

## 2019-11-05 VITALS
BODY MASS INDEX: 24.64 KG/M2 | TEMPERATURE: 97.7 F | HEART RATE: 76 BPM | DIASTOLIC BLOOD PRESSURE: 80 MMHG | SYSTOLIC BLOOD PRESSURE: 133 MMHG | HEIGHT: 71 IN | OXYGEN SATURATION: 98 % | WEIGHT: 176 LBS | RESPIRATION RATE: 18 BRPM

## 2019-11-05 DIAGNOSIS — Z13.220 SCREENING CHOLESTEROL LEVEL: ICD-10-CM

## 2019-11-05 DIAGNOSIS — L65.9 HAIR LOSS: ICD-10-CM

## 2019-11-05 DIAGNOSIS — L65.9 HAIR LOSS: Primary | ICD-10-CM

## 2019-11-05 LAB
ALBUMIN SERPL-MCNC: 4.3 G/DL (ref 3.4–5)
ALBUMIN/GLOB SERPL: 1.1 {RATIO} (ref 0.8–1.7)
ALP SERPL-CCNC: 78 U/L (ref 45–117)
ALT SERPL-CCNC: 21 U/L (ref 16–61)
ANION GAP SERPL CALC-SCNC: 5 MMOL/L (ref 3–18)
AST SERPL-CCNC: 16 U/L (ref 10–38)
BILIRUB SERPL-MCNC: 0.9 MG/DL (ref 0.2–1)
BUN SERPL-MCNC: 13 MG/DL (ref 7–18)
BUN/CREAT SERPL: 14 (ref 12–20)
CALCIUM SERPL-MCNC: 9.5 MG/DL (ref 8.5–10.1)
CHLORIDE SERPL-SCNC: 105 MMOL/L (ref 100–111)
CHOLEST SERPL-MCNC: 194 MG/DL
CO2 SERPL-SCNC: 29 MMOL/L (ref 21–32)
CREAT SERPL-MCNC: 0.9 MG/DL (ref 0.6–1.3)
ERYTHROCYTE [DISTWIDTH] IN BLOOD BY AUTOMATED COUNT: 13.5 % (ref 11.6–14.5)
GLOBULIN SER CALC-MCNC: 3.8 G/DL (ref 2–4)
GLUCOSE SERPL-MCNC: 95 MG/DL (ref 74–99)
HCT VFR BLD AUTO: 48 % (ref 36–48)
HDLC SERPL-MCNC: 64 MG/DL (ref 40–60)
HDLC SERPL: 3 {RATIO} (ref 0–5)
HGB BLD-MCNC: 15.4 G/DL (ref 13–16)
LDLC SERPL CALC-MCNC: 109.2 MG/DL (ref 0–100)
LIPID PROFILE,FLP: ABNORMAL
MCH RBC QN AUTO: 31.1 PG (ref 24–34)
MCHC RBC AUTO-ENTMCNC: 32.1 G/DL (ref 31–37)
MCV RBC AUTO: 97 FL (ref 74–97)
PLATELET # BLD AUTO: 267 K/UL (ref 135–420)
PMV BLD AUTO: 11.1 FL (ref 9.2–11.8)
POTASSIUM SERPL-SCNC: 4.8 MMOL/L (ref 3.5–5.5)
PROT SERPL-MCNC: 8.1 G/DL (ref 6.4–8.2)
RBC # BLD AUTO: 4.95 M/UL (ref 4.7–5.5)
SODIUM SERPL-SCNC: 139 MMOL/L (ref 136–145)
TRIGL SERPL-MCNC: 104 MG/DL (ref ?–150)
VLDLC SERPL CALC-MCNC: 20.8 MG/DL
WBC # BLD AUTO: 6.7 K/UL (ref 4.6–13.2)

## 2019-11-05 PROCEDURE — 80053 COMPREHEN METABOLIC PANEL: CPT

## 2019-11-05 PROCEDURE — 85027 COMPLETE CBC AUTOMATED: CPT

## 2019-11-05 PROCEDURE — 36415 COLL VENOUS BLD VENIPUNCTURE: CPT

## 2019-11-05 PROCEDURE — 80061 LIPID PANEL: CPT

## 2019-11-05 RX ORDER — FINASTERIDE 1 MG/1
TABLET, FILM COATED ORAL
Qty: 90 TAB | Refills: 3 | Status: SHIPPED | OUTPATIENT
Start: 2019-11-05

## 2019-11-05 NOTE — PROGRESS NOTES
Chief Complaint   Patient presents with    Alopecia   1. Have you been to the ER, urgent care clinic since your last visit? Hospitalized since your last visit? No    2. Have you seen or consulted any other health care providers outside of the [address] [place] [address] since your last visit? Include any pap smears or colon screening.  No

## 2019-11-05 NOTE — PROGRESS NOTES
HISTORY OF PRESENT ILLNESS  Dre Kearns is a 30 y.o. male. HPI   Pt is here for f/u and refill on his Propecia which is continuing to do well. He is fasting today so along with labs we will get a cholesterol as he has never had one. Allergies   Allergen Reactions    Morphine Hives       Current Outpatient Medications   Medication Sig    finasteride (PROPECIA) 1 mg tablet TAKE ONE TABLET BY MOUTH ONCE DAILY     No current facility-administered medications for this visit. Review of Systems   Constitutional: Negative. Negative for chills, fever and malaise/fatigue. HENT: Negative. Negative for congestion, ear pain, sore throat and tinnitus. Eyes: Negative. Negative for blurred vision, double vision and photophobia. Respiratory: Negative. Negative for cough, shortness of breath and wheezing. Cardiovascular: Negative. Negative for chest pain, palpitations and leg swelling. Gastrointestinal: Negative. Negative for abdominal pain, heartburn, nausea and vomiting. Genitourinary: Negative. Negative for dysuria, frequency, hematuria and urgency. Musculoskeletal: Negative. Negative for back pain, joint pain, myalgias and neck pain. Skin: Negative for itching and rash. Neurological: Negative. Negative for dizziness, tingling, tremors and headaches. Psychiatric/Behavioral: Negative. Negative for depression and memory loss. The patient is not nervous/anxious and does not have insomnia. Visit Vitals  /80   Pulse 76   Temp 97.7 °F (36.5 °C) (Oral)   Resp 18   Ht 5' 11\" (1.803 m)   Wt 176 lb (79.8 kg)   SpO2 98%   BMI 24.55 kg/m²       Physical Exam   Constitutional: He is oriented to person, place, and time. He appears well-developed and well-nourished. HENT:   Head: Normocephalic and atraumatic. Nose: Nose normal.   Mouth/Throat: Oropharynx is clear and moist.   Cardiovascular: Normal rate, regular rhythm and normal heart sounds.  Exam reveals no gallop and no friction rub. No murmur heard. Pulmonary/Chest: Effort normal and breath sounds normal. No respiratory distress. He has no wheezes. He has no rales. Neurological: He is alert and oriented to person, place, and time. Skin: Skin is warm and dry. Psychiatric: He has a normal mood and affect. His behavior is normal.       ASSESSMENT and PLAN    ICD-10-CM ICD-9-CM    1. Hair loss X58.1 804.11 METABOLIC PANEL, COMPREHENSIVE      CBC W/O DIFF      finasteride (PROPECIA) 1 mg tablet   2. Screening cholesterol level Z13.220 V77.91 LIPID PANEL     Follow-up and Dispositions    · Return in about 1 year (around 11/5/2020) for follow up. Pt expressed understanding of visit summary and plans for any follow ups or referrals as well as any medications prescribed.

## 2022-08-23 ENCOUNTER — TELEPHONE (OUTPATIENT)
Dept: FAMILY MEDICINE CLINIC | Age: 32
End: 2022-08-23